# Patient Record
(demographics unavailable — no encounter records)

---

## 2024-10-21 NOTE — INTERPRETER SERVICES
[[employer] ] : provided by [employer]   [Interpreters_IDNumber] : [id number] [Interpreters_FullName] : Igor

## 2024-10-21 NOTE — REVIEW OF SYSTEMS
[Negative] : Heme/Lymph [Constipation] : constipation [FreeTextEntry7] : Constipation once in a while [FreeTextEntry8] : Urinating slowly at times

## 2024-10-21 NOTE — PLAN
[FreeTextEntry1] : Mr. Rodriguez is a 70yo male with PMH of HTN, HLD, bradycardia, cataracts, L-urolithiasis s/p ureteroscopy s/p stent pull 8/2 c/b urethral trauma and hematuria requiring glass presenting for [facility] for a bladder biopsy on 11/8/24.  1. Medical clearance - Patient is medically cleared for his surgery - Went over pre-surgical instructions again per PST notes that patient brought in (no food past 11pm the night before, can drink water or Gatorade morning of)  2. HTN - Start taking losartan 25mg ([facility] per patient's request until PEDRO application is completed)  3. HLD - Refilled atorvastatin 40mg  4. BPH/trouble urinating - Continue taking tamsulosin 0.4mg nightly  5. HCM - PEDRO application started. Will find out how much his daughter makes to see if he qualifies - PST labs and note reviewed with patient  Return to clinic as needed

## 2024-10-21 NOTE — COUNSELING
[Healthy eating counseling provided] : healthy eating [Good understanding] : Patient has a good understanding of disease, goals and obesity follow-up plan [Low Fat Diet] : Low fat diet [Low Salt Diet] : Low salt diet

## 2024-10-21 NOTE — ASSESSMENT
[Patient Optimized for Surgery] : Patient optimized for surgery [No Further Testing Recommended] : no further testing recommended [High Risk Surgery - Intraperitoneal, Intrathoracic or Supringuinal Vascular Procedures] : High Risk Surgery - Intraperitoneal, Intrathoracic or Supringuinal Vascular Procedures - No (0) [Ischemic Heart Disease] : Ischemic Heart Disease - No (0) [Congestive Heart Failure] : Congestive Heart Failure - No (0) [Prior Cerebrovascular Accident or TIA] : Prior Cerebrovascular Accident or TIA - No (0) [Creatinine >= 2mg/dL (1 Point)] : Creatinine >= 2mg/dL - No (0) [Insulin-dependent Diabetic (1 Point)] : Insulin-dependent Diabetic - No (0) [0] : 0 , RCRI Class: I, Risk of Post-Op Cardiac Complications: 3.9%, 95% CI for Risk Estimate: 2.8% - 5.4% [FreeTextEntry4] : Medically cleared for surgery. Reviewed PST labs with patient.

## 2024-10-21 NOTE — HISTORY OF PRESENT ILLNESS
[No Pertinent Cardiac History] : no history of aortic stenosis, atrial fibrillation, coronary artery disease, recent myocardial infarction, or implantable device/pacemaker [No Pertinent Pulmonary History] : no history of asthma, COPD, sleep apnea, or smoking [No Adverse Anesthesia Reaction] : no adverse anesthesia reaction in self or family member [(Patient denies any chest pain, claudication, dyspnea on exertion, orthopnea, palpitations or syncope)] : Patient denies any chest pain, claudication, dyspnea on exertion, orthopnea, palpitations or syncope [Good (7-10 METs)] : Good (7-10 METs) [Chronic Anticoagulation] : no chronic anticoagulation [Chronic Kidney Disease] : no chronic kidney disease [Diabetes] : no diabetes [FreeTextEntry1] : Cystoscopy with biopsy of bladder mass [FreeTextEntry2] : 11/8/24 [FreeTextEntry3] : Dr. Fermin Chambers [FreeTextEntry4] : Mr. Rodriguez is a 68yo male with PMH of HLD, HTN, pre-diabetes, nephrolithiasis, hx BPH, and surgical hx of appendectomy who presents for MCA. He went for pre-surgical testing on 10/18/24. He feels well and is ready for his surgery.  He hasn't been taking his Atorvastatin since he ran out of it. The only medication he is taking daily is tamsulosin. He doesn't take any OTC medications or herbal supplements on a regular basis. He takes Tylenol once in a while if he has pain, but it's very rare. He doesn't take anything for his HTN and hasn't in a few years.  He denies fevers, chills, headaches, changes in vision, chest pain, shortness of breath or trouble breathing.  He went for PST on 10/18/24.

## 2025-01-14 NOTE — PHYSICAL EXAM
[Soft] : abdomen soft [Non Tender] : non-tender [Non-distended] : non-distended [No Masses] : no abdominal mass palpated [Normal Bowel Sounds] : normal bowel sounds [Normal] : affect was normal and insight and judgment were intact [de-identified] : No suprapubic or cva tenderness

## 2025-01-14 NOTE — PLAN
[FreeTextEntry1] : Mr. Rodriguez is a 70yo male with PMH of HLD, HTN, pre-diabetes, nephrolithiasis, hx BPH, and surgical hx of appendectomy who's here for a follow up visit s/p cystoscopy by urology due to a bladder nodule which was found to be benign.   Bladder lesion - benign  s/p cystoscopy showing lymphoid aggregation which is benign  Patient without any cardiopulmonary, gastrointestinal, genitourinary symptoms  VSS No need for labs at this point in time  Plan: CTM Symptoms  Follow up with PCP regularly scheduled appt follow up with urology at normal interval  No need for labs at this point in time

## 2025-01-14 NOTE — REVIEW OF SYSTEMS
[Negative] : Heme/Lymph [Abdominal Pain] : no abdominal pain [Nausea] : no nausea [Constipation] : no constipation [Diarrhea] : no diarrhea [Vomiting] : no vomiting [Heartburn] : no heartburn [Melena] : no melena [Dysuria] : no dysuria [Incontinence] : no incontinence [Hesitancy] : no hesitancy [Nocturia] : no nocturia [Hematuria] : no hematuria [Frequency] : no frequency [Impotence] : no impotency

## 2025-01-14 NOTE — HISTORY OF PRESENT ILLNESS
[FreeTextEntry1] : Follow up appointment [de-identified] : Mr. Rodriguez is a 68yo male with PMH of HLD, HTN, pre-diabetes, nephrolithiasis, hx BPH, and surgical hx of appendectomy who comes in for a follow-up appointment after cystoscopy after abnormal lesions found in the bladder on ultrasound.  Doing well after cystoscopy/biopsy of bladder nodule The patient was already told that the findings were benign by urology  No urinary symptoms at this point in time - pain, frequency, urgency, or bleeding, no changes in color  No gastrointestinal symptoms - no gastrointestinal symptoms such as changes in stools. bleeding from mouth or anus, or abdominal pain, nausea, vomiting  Cardiopulmonary ROS negative as well. Taking normal meds including statin, arb, tamsulosin, tadalafil   VSS - /80 PE cardio pulm gi gu exam benign Patient doing well overall   Plan: No need for labs at this point in time  CTM Symptoms

## 2025-01-14 NOTE — HISTORY OF PRESENT ILLNESS
[FreeTextEntry1] : Follow up appointment [de-identified] : Mr. Rodriguez is a 68yo male with PMH of HLD, HTN, pre-diabetes, nephrolithiasis, hx BPH, and surgical hx of appendectomy who comes in for a follow-up appointment after cystoscopy after abnormal lesions found in the bladder on ultrasound.  Doing well after cystoscopy/biopsy of bladder nodule The patient was already told that the findings were benign by urology  No urinary symptoms at this point in time - pain, frequency, urgency, or bleeding, no changes in color  No gastrointestinal symptoms - no gastrointestinal symptoms such as changes in stools. bleeding from mouth or anus, or abdominal pain, nausea, vomiting  Cardiopulmonary ROS negative as well. Taking normal meds including statin, arb, tamsulosin, tadalafil   VSS - /80 PE cardio pulm gi gu exam benign Patient doing well overall   Plan: No need for labs at this point in time  CTM Symptoms

## 2025-01-14 NOTE — REVIEW OF SYSTEMS
[Negative] : Heme/Lymph [Abdominal Pain] : no abdominal pain [Nausea] : no nausea [Constipation] : no constipation [Diarrhea] : no diarrhea [Vomiting] : no vomiting No [Heartburn] : no heartburn [Melena] : no melena [Dysuria] : no dysuria [Incontinence] : no incontinence [Hesitancy] : no hesitancy [Nocturia] : no nocturia [Hematuria] : no hematuria [Frequency] : no frequency [Impotence] : no impotency

## 2025-01-14 NOTE — PLAN
[FreeTextEntry1] : Mr. Rodriguez is a 68yo male with PMH of HLD, HTN, pre-diabetes, nephrolithiasis, hx BPH, and surgical hx of appendectomy who's here for a follow up visit s/p cystoscopy by urology due to a bladder nodule which was found to be benign.   Bladder lesion - benign  s/p cystoscopy showing lymphoid aggregation which is benign  Patient without any cardiopulmonary, gastrointestinal, genitourinary symptoms  VSS No need for labs at this point in time  Plan: CTM Symptoms  Follow up with PCP regularly scheduled appt follow up with urology at normal interval  No need for labs at this point in time

## 2025-01-14 NOTE — PHYSICAL EXAM
[Soft] : abdomen soft [Non Tender] : non-tender [Non-distended] : non-distended [No Masses] : no abdominal mass palpated [Normal Bowel Sounds] : normal bowel sounds [Normal] : affect was normal and insight and judgment were intact [de-identified] : No suprapubic or cva tenderness

## 2025-02-20 NOTE — PHYSICAL EXAM
[No Acute Distress] : no acute distress [Normal] : no joint swelling and grossly normal strength and tone

## 2025-02-22 NOTE — INTERPRETER SERVICES
[[employer] ] : provided by [employer]   [Time Spent: ____ minutes] : Total time spent using  services: [unfilled] minutes. The patient's primary language is not English thus required  services. [Interpreters_IDNumber] : [id number] [Interpreters_FullName] : Dilma [TWNoteComboBox1] : [place]

## 2025-02-22 NOTE — INTERPRETER SERVICES
[[employer] ] : provided by [employer]   [Time Spent: ____ minutes] : Total time spent using  services: [unfilled] minutes. The patient's primary language is not English thus required  services. [Interpreters_IDNumber] : [id number] [Interpreters_FullName] : Dilma [TWNoteComboBox1] : [place]

## 2025-02-22 NOTE — HISTORY OF PRESENT ILLNESS
[FreeTextEntry8] : 70 y/o M w pmhx of HLD, HTN, pre-DM presents for generalized malaise x2weeks. States he had a 'flu' 2 weeks ago and since then intermittent night sweals, lingering dry cough and occasional 'burning belly' sensation noted. Lives at home with 1 daughter and 3 of her sons. Did not actually check temperature at home. Notes no actual abd pain however just occasional burning sensation which comes and goes, does not describe it as GERD. Not a smoker, occasional alcohol use.  Taking all meds currently including statin, losartan (asking for refills).  No sick contacts. Is not working at this time. Did not get flu shot this year.  Denies diarrhea or GI complaints.

## 2025-02-22 NOTE — REVIEW OF SYSTEMS
[Chills] : chills [Fatigue] : fatigue [Recent Change In Weight] : ~T no recent weight change [Earache] : no earache [Chest Pain] : no chest pain [Shortness Of Breath] : no shortness of breath [Vomiting] : no vomiting [Itching] : no itching [Headache] : no headache [FreeTextEntry2] : malaise, chills, night sweats

## 2025-03-01 NOTE — CARDIOLOGY SUMMARY
[de-identified] : TTE 5/3/24  1. Left ventricular cavity is normal in size. Left ventricular wall thickness is normal. Left ventricular systolic function is normal with an ejection fraction of 51 % by Rizo's method of disks. There are no regional wall motion abnormalities seen.  2. Normal left ventricular diastolic function, with normal filling pressure.  3. Moderately enlarged right ventricular cavity size, with normal wall thickness, and normal systolic function.  4. No pericardial effusion seen.  5. No prior echocardiogram is available for comparison.

## 2025-03-01 NOTE — DISCUSSION/SUMMARY
[FreeTextEntry1] : 67 yo F h/o HTN, HLD, appendectomy, kidney stones who presents after referral from recent hospitalization for bradycardia following colonoscopy.   Bradycardia:  - no further workup indicated; pt advised to follow up with PCP and RTC if experiences syncope or presyncope symptoms associated with low HR or with exertion

## 2025-03-01 NOTE — HISTORY OF PRESENT ILLNESS
[FreeTextEntry1] : 69 yo F h/o HTN, HLD, appendectomy, kidney stones who presents for followup.    Pt unsure why he was arranged for follow-up. He was initially seen by cardiology after referral from recent hospitalization for bradycardia following colonoscopy. Per discharge summary, pt presented for elective colonoscopy which went uneventful. Patient was bradycardic post procedure and admitted under medicine for further evaluation.  He states he was asymptomatic but HR was in 40's. TTE done was unremarkable. He has been off most cardiac medications post Covid-19 pandemic but was previously on lisinopril and atorvastatin. He has been well following procedure and reports no sx of syncope or fatigue associated with low heart rates. Prior EKG showing sinus bradycardia in 2012.   EKG today shows NSR 62. Otherwise, denies any dizziness, chest pain, shortness of breath, nausea, vomiting, diarrhea, constipation, fever, chills, lightheadedness, dysuria.

## 2025-07-29 NOTE — ASSESSMENT
[Vaccines Reviewed] : Immunizations reviewed today. Please see immunization details in the vaccine log within the immunization flowsheet.  [FreeTextEntry1] : Mr. Glover is a 69 yo M with HTN, HLD, Pre-DM, BPH here today for general healthcare maintenance visit.  Hyperlipidemia His Lipid Profile tested February 2025 was wnl Plan: - Continue atorvastatin 40 mg daily  Hypertension BP in clinic today was 128/90.  Plan: - Continue Losartan 25 mg PO daily - Recommended keeping a diary of blood pressure readings  BPH Plan: - Continue tamsulosin 0.4 mg PO daily   Pre-DM Plan: - No medication start today, will reassess with labs in the future - Counseled on dietary changes and exercise changes  Healthcare Maintenance Colonoscopy: 2024, hyperplastic polyps, repeat in 10 years Abdominal Aortic Aneurysm Screening: ordered  Immunizations: Shingles First dose today Lab work: Next visit will order CBC, BMP, Lipids, A1C, Hep B screening, and HIV screening Will continue to monitor cataracts  Follow-up with me in 6 months for return visit for healthcare maintenance

## 2025-07-29 NOTE — INTERPRETER SERVICES
[[employer] ] : provided by [employer]   [Interpreters_IDNumber] : [id number] [Interpreters_FullName] : Joshua

## 2025-07-29 NOTE — PHYSICAL EXAM
[No Acute Distress] : no acute distress [Well Nourished] : well nourished [Well Developed] : well developed [Well-Appearing] : well-appearing [EOMI] : extraocular movements intact [Normal Outer Ear/Nose] : the outer ears and nose were normal in appearance [Normal Oropharynx] : the oropharynx was normal [No JVD] : no jugular venous distention [No Lymphadenopathy] : no lymphadenopathy [Supple] : supple [Thyroid Normal, No Nodules] : the thyroid was normal and there were no nodules present [No Respiratory Distress] : no respiratory distress  [No Accessory Muscle Use] : no accessory muscle use [Clear to Auscultation] : lungs were clear to auscultation bilaterally [Normal Rate] : normal rate  [Regular Rhythm] : with a regular rhythm [Normal S1, S2] : normal S1 and S2 [No Murmur] : no murmur heard [Soft] : abdomen soft [Non Tender] : non-tender [Normal Bowel Sounds] : normal bowel sounds [Grossly Normal Strength/Tone] : grossly normal strength/tone [No Focal Deficits] : no focal deficits [PERRL] : pupils equal round and reactive to light [de-identified] : Cataract formation around periphery of eye [de-identified] : Rectus Diastasis midline with palpable bowel

## 2025-07-29 NOTE — HISTORY OF PRESENT ILLNESS
[de-identified] : Mr. Glover is a 71 yo M with HTN, HLD, Pre-DM, BPH here today for general healthcare maintenance visit. He states that he is in good health and has no acute concerns today. He endorses needing medication refills. On ROS, he states that his cataracts have been checked out by an eye doctor who stated that it was not ready for surgery yet. Since then, he has not noticed any worsening of vision at night or with driving.   He last states having SOB and runny nose 2 months ago when he had a viral infection but has since resolved. He endorses occasional constipation which resolves with OTC medications. Some days he has difficulty initiating urine stream, but since being on the tamsulosin, he feels it is stable/improved.   He denies any current SOB, cough, runny nose, fatigue, loss of appetite, anginal/exertional CP, dysuria, urinary frequency, muscle aches or joint pains. He takes 3 medications routinely: losartan 25 daily, tamsulosin 0.4 daily and atorvastatin 40 mg daily and uses the [facility] in [place] as his pharmacy.   Social History: He lives in [place] with his daughter. 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 He loves watching the [employer] and his favorite team is [employer]. He is  from his wife but is sexually active with her when she comes to visit them. He is also sexually active with one other partner. He denies any alcohol use in the past 5 years. Prior to that he used to drink recreationally. He smoked cigarettes for 10 years but quit in 1995. He denies any recreational drug use.  HCM: CBC and Lipids wnl when measured in February 2025. A1C borderline at 5.8. Got colonoscopy in November of 2024. Due for shingles shot, HIV screening, Hep B screening, and aortic aneurysm screening

## 2025-07-29 NOTE — REVIEW OF SYSTEMS
[Vision Problems] : vision problems [Constipation] : constipation [Hesitancy] : hesitancy [Fever] : no fever [Chills] : no chills [Night Sweats] : no night sweats [Recent Change In Weight] : ~T no recent weight change [Discharge] : no discharge [Pain] : no pain [Redness] : no redness [Earache] : no earache [Hearing Loss] : no hearing loss [Nosebleeds] : no nosebleeds [Postnasal Drip] : no postnasal drip [Nasal Discharge] : no nasal discharge [Sore Throat] : no sore throat [Chest Pain] : no chest pain [Shortness Of Breath] : no shortness of breath [Wheezing] : no wheezing [Cough] : no cough [Abdominal Pain] : no abdominal pain [Nausea] : no nausea [Vomiting] : no vomiting [Dysuria] : no dysuria [Incontinence] : no incontinence [Hematuria] : no hematuria [Joint Pain] : no joint pain [Joint Stiffness] : no joint stiffness [Muscle Pain] : no muscle pain [Back Pain] : no back pain

## 2025-07-29 NOTE — HISTORY OF PRESENT ILLNESS
[de-identified] : Mr. Glover is a 71 yo M with HTN, HLD, Pre-DM, BPH here today for general healthcare maintenance visit. He states that he is in good health and has no acute concerns today. He endorses needing medication refills. On ROS, he states that his cataracts have been checked out by an eye doctor who stated that it was not ready for surgery yet. Since then, he has not noticed any worsening of vision at night or with driving.   He last states having SOB and runny nose 2 months ago when he had a viral infection but has since resolved. He endorses occasional constipation which resolves with OTC medications. Some days he has difficulty initiating urine stream, but since being on the tamsulosin, he feels it is stable/improved.   He denies any current SOB, cough, runny nose, fatigue, loss of appetite, anginal/exertional CP, dysuria, urinary frequency, muscle aches or joint pains. He takes 3 medications routinely: losartan 25 daily, tamsulosin 0.4 daily and atorvastatin 40 mg daily and uses the [email or web address] in [place] as his pharmacy.   Social History: He lives in [place] with his daughter. 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 He loves watching the [employer] and his favorite team is [employer]. He is  from his wife but is sexually active with her when she comes to visit them. He is also sexually active with one other partner. He denies any alcohol use in the past 5 years. Prior to that he used to drink recreationally. He smoked cigarettes for 10 years but quit in 1995. He denies any recreational drug use.  HCM: CBC and Lipids wnl when measured in February 2025. A1C borderline at 5.8. Got colonoscopy in November of 2024. Due for shingles shot, HIV screening, Hep B screening, and aortic aneurysm screening

## 2025-07-29 NOTE — INTERPRETER SERVICES
[[employer] ] : provided by [employer]   [Interpreters_IDNumber] : [id number] [Interpreters_FullName] : Joshua

## 2025-07-29 NOTE — PHYSICAL EXAM
[No Acute Distress] : no acute distress [Well Nourished] : well nourished [Well Developed] : well developed [Well-Appearing] : well-appearing [EOMI] : extraocular movements intact [Normal Outer Ear/Nose] : the outer ears and nose were normal in appearance [Normal Oropharynx] : the oropharynx was normal [No JVD] : no jugular venous distention [No Lymphadenopathy] : no lymphadenopathy [Supple] : supple [Thyroid Normal, No Nodules] : the thyroid was normal and there were no nodules present [No Respiratory Distress] : no respiratory distress  [No Accessory Muscle Use] : no accessory muscle use [Clear to Auscultation] : lungs were clear to auscultation bilaterally [Normal Rate] : normal rate  [Regular Rhythm] : with a regular rhythm [Normal S1, S2] : normal S1 and S2 [No Murmur] : no murmur heard [Soft] : abdomen soft [Non Tender] : non-tender [Normal Bowel Sounds] : normal bowel sounds [Grossly Normal Strength/Tone] : grossly normal strength/tone [No Focal Deficits] : no focal deficits [PERRL] : pupils equal round and reactive to light [de-identified] : Cataract formation around periphery of eye [de-identified] : Rectus Diastasis midline with palpable bowel